# Patient Record
Sex: FEMALE | Race: WHITE | NOT HISPANIC OR LATINO | URBAN - METROPOLITAN AREA
[De-identification: names, ages, dates, MRNs, and addresses within clinical notes are randomized per-mention and may not be internally consistent; named-entity substitution may affect disease eponyms.]

---

## 2021-05-05 ENCOUNTER — INPATIENT (INPATIENT)
Facility: HOSPITAL | Age: 66
LOS: 0 days | Discharge: HOME | DRG: 274 | End: 2021-05-06
Attending: INTERNAL MEDICINE | Admitting: INTERNAL MEDICINE
Payer: MEDICARE

## 2021-05-05 VITALS — WEIGHT: 147.05 LBS | HEIGHT: 64 IN

## 2021-05-05 DIAGNOSIS — I10 ESSENTIAL (PRIMARY) HYPERTENSION: ICD-10-CM

## 2021-05-05 DIAGNOSIS — Z98.890 OTHER SPECIFIED POSTPROCEDURAL STATES: Chronic | ICD-10-CM

## 2021-05-05 DIAGNOSIS — I48.0 PAROXYSMAL ATRIAL FIBRILLATION: ICD-10-CM

## 2021-05-05 LAB
ANION GAP SERPL CALC-SCNC: 13 MMOL/L — SIGNIFICANT CHANGE UP (ref 5–17)
APTT BLD: 36.8 SEC — HIGH (ref 27.5–35.5)
BLD GP AB SCN SERPL QL: NEGATIVE — SIGNIFICANT CHANGE UP
BUN SERPL-MCNC: 31 MG/DL — HIGH (ref 7–23)
CALCIUM SERPL-MCNC: 9.4 MG/DL — SIGNIFICANT CHANGE UP (ref 8.4–10.5)
CHLORIDE SERPL-SCNC: 106 MMOL/L — SIGNIFICANT CHANGE UP (ref 96–108)
CO2 SERPL-SCNC: 21 MMOL/L — LOW (ref 22–31)
CREAT SERPL-MCNC: 1.03 MG/DL — SIGNIFICANT CHANGE UP (ref 0.5–1.3)
GLUCOSE SERPL-MCNC: 115 MG/DL — HIGH (ref 70–99)
HCT VFR BLD CALC: 40.8 % — SIGNIFICANT CHANGE UP (ref 34.5–45)
HGB BLD-MCNC: 13.5 G/DL — SIGNIFICANT CHANGE UP (ref 11.5–15.5)
INR BLD: 1.38 — HIGH (ref 0.88–1.16)
MCHC RBC-ENTMCNC: 29.2 PG — SIGNIFICANT CHANGE UP (ref 27–34)
MCHC RBC-ENTMCNC: 33.1 GM/DL — SIGNIFICANT CHANGE UP (ref 32–36)
MCV RBC AUTO: 88.3 FL — SIGNIFICANT CHANGE UP (ref 80–100)
NRBC # BLD: 0 /100 WBCS — SIGNIFICANT CHANGE UP (ref 0–0)
PLATELET # BLD AUTO: 315 K/UL — SIGNIFICANT CHANGE UP (ref 150–400)
POTASSIUM SERPL-MCNC: 4.2 MMOL/L — SIGNIFICANT CHANGE UP (ref 3.5–5.3)
POTASSIUM SERPL-SCNC: 4.2 MMOL/L — SIGNIFICANT CHANGE UP (ref 3.5–5.3)
PROTHROM AB SERPL-ACNC: 16.3 SEC — HIGH (ref 10.6–13.6)
RBC # BLD: 4.62 M/UL — SIGNIFICANT CHANGE UP (ref 3.8–5.2)
RBC # FLD: 13.2 % — SIGNIFICANT CHANGE UP (ref 10.3–14.5)
RH IG SCN BLD-IMP: POSITIVE — SIGNIFICANT CHANGE UP
SODIUM SERPL-SCNC: 140 MMOL/L — SIGNIFICANT CHANGE UP (ref 135–145)
WBC # BLD: 10.27 K/UL — SIGNIFICANT CHANGE UP (ref 3.8–10.5)
WBC # FLD AUTO: 10.27 K/UL — SIGNIFICANT CHANGE UP (ref 3.8–10.5)

## 2021-05-05 RX ORDER — METHYLPREDNISOLONE 4 MG
1 TABLET ORAL
Qty: 0 | Refills: 0 | DISCHARGE

## 2021-05-05 RX ORDER — PROPAFENONE HCL 150 MG
225 TABLET ORAL ONCE
Refills: 0 | Status: COMPLETED | OUTPATIENT
Start: 2021-05-05 | End: 2021-05-05

## 2021-05-05 RX ORDER — ACETAMINOPHEN 500 MG
650 TABLET ORAL ONCE
Refills: 0 | Status: COMPLETED | OUTPATIENT
Start: 2021-05-05 | End: 2021-05-05

## 2021-05-05 RX ORDER — EZETIMIBE 10 MG/1
1 TABLET ORAL
Qty: 0 | Refills: 0 | DISCHARGE

## 2021-05-05 RX ORDER — CEFAZOLIN SODIUM 1 G
2000 VIAL (EA) INJECTION ONCE
Refills: 0 | Status: COMPLETED | OUTPATIENT
Start: 2021-05-05 | End: 2021-05-05

## 2021-05-05 RX ORDER — AMLODIPINE BESYLATE 2.5 MG/1
1 TABLET ORAL
Qty: 0 | Refills: 0 | DISCHARGE

## 2021-05-05 RX ORDER — LISINOPRIL 2.5 MG/1
20 TABLET ORAL DAILY
Refills: 0 | Status: DISCONTINUED | OUTPATIENT
Start: 2021-05-05 | End: 2021-05-06

## 2021-05-05 RX ORDER — CEFAZOLIN SODIUM 1 G
2000 VIAL (EA) INJECTION ONCE
Refills: 0 | Status: DISCONTINUED | OUTPATIENT
Start: 2021-05-05 | End: 2021-05-05

## 2021-05-05 RX ORDER — LISINOPRIL 2.5 MG/1
1 TABLET ORAL
Qty: 0 | Refills: 0 | DISCHARGE

## 2021-05-05 RX ORDER — RIVAROXABAN 15 MG-20MG
20 KIT ORAL
Refills: 0 | Status: DISCONTINUED | OUTPATIENT
Start: 2021-05-05 | End: 2021-05-06

## 2021-05-05 RX ORDER — PANTOPRAZOLE SODIUM 20 MG/1
40 TABLET, DELAYED RELEASE ORAL
Refills: 0 | Status: DISCONTINUED | OUTPATIENT
Start: 2021-05-05 | End: 2021-05-06

## 2021-05-05 RX ORDER — PROPAFENONE HCL 150 MG
1 TABLET ORAL
Qty: 0 | Refills: 0 | DISCHARGE

## 2021-05-05 RX ORDER — RIVAROXABAN 15 MG-20MG
1 KIT ORAL
Qty: 0 | Refills: 0 | DISCHARGE

## 2021-05-05 RX ORDER — AMLODIPINE BESYLATE 2.5 MG/1
5 TABLET ORAL DAILY
Refills: 0 | Status: DISCONTINUED | OUTPATIENT
Start: 2021-05-05 | End: 2021-05-06

## 2021-05-05 RX ADMIN — Medication 650 MILLIGRAM(S): at 21:17

## 2021-05-05 RX ADMIN — PANTOPRAZOLE SODIUM 40 MILLIGRAM(S): 20 TABLET, DELAYED RELEASE ORAL at 16:42

## 2021-05-05 RX ADMIN — Medication 2000 MILLIGRAM(S): at 09:00

## 2021-05-05 RX ADMIN — Medication 650 MILLIGRAM(S): at 20:17

## 2021-05-05 RX ADMIN — AMLODIPINE BESYLATE 5 MILLIGRAM(S): 2.5 TABLET ORAL at 16:42

## 2021-05-05 RX ADMIN — RIVAROXABAN 20 MILLIGRAM(S): KIT at 21:43

## 2021-05-05 RX ADMIN — Medication 225 MILLIGRAM(S): at 21:43

## 2021-05-05 NOTE — H&P ADULT - NSICDXPASTMEDICALHX_GEN_ALL_CORE_FT
PAST MEDICAL HISTORY:  HLD (hyperlipidemia)     HTN (hypertension)     Paroxysmal atrial fibrillation

## 2021-05-05 NOTE — CHART NOTE - NSCHARTNOTEFT_GEN_A_CORE
Pre-op Diagnosis:  Paroxysmal atrial fibrillation  Atrial flutter     Post-op Diagnosis:  Same     Procedure:  Pulmonary vein isolation  CTI ablation    Electrophysiologist:  Mu Huber     Fellow:  Delphine Denny MD    Anesthesia:  General anesthesia     Access:  Bilateral femoral vein access     Description:  Successful pulmonary vein isolation  Successful cavotricuspid isthmus ablation    Complications:  None     EBL:  less than 30 ml     Disposition:  stable, to recovery     Plan:  Admit to inpatient with discharge tomorrow.  Bed rest for 2 hours.  ECG post procedure.  Resume xarelto tonight  Pantoprazole 40 mg po daily for 30 days.   Resume propafenone.

## 2021-05-05 NOTE — H&P ADULT - ASSESSMENT
64 yo F with history of HTN, HLD. non obstructive CAD, paroxsymal atrial fibrillation presents today for scheduled EPS and an ablation.

## 2021-05-05 NOTE — H&P ADULT - NSICDXPASTSURGICALHX_GEN_ALL_CORE_FT
PAST SURGICAL HISTORY:  History of hand surgery     History of hernia repair     History of knee surgery

## 2021-05-06 ENCOUNTER — TRANSCRIPTION ENCOUNTER (OUTPATIENT)
Age: 66
End: 2021-05-06

## 2021-05-06 VITALS — TEMPERATURE: 98 F

## 2021-05-06 PROCEDURE — 85730 THROMBOPLASTIN TIME PARTIAL: CPT

## 2021-05-06 PROCEDURE — 86901 BLOOD TYPING SEROLOGIC RH(D): CPT

## 2021-05-06 PROCEDURE — C1732: CPT

## 2021-05-06 PROCEDURE — 86850 RBC ANTIBODY SCREEN: CPT

## 2021-05-06 PROCEDURE — 85610 PROTHROMBIN TIME: CPT

## 2021-05-06 PROCEDURE — 85027 COMPLETE CBC AUTOMATED: CPT

## 2021-05-06 PROCEDURE — 86900 BLOOD TYPING SEROLOGIC ABO: CPT

## 2021-05-06 PROCEDURE — C1766: CPT

## 2021-05-06 PROCEDURE — 80048 BASIC METABOLIC PNL TOTAL CA: CPT

## 2021-05-06 PROCEDURE — C1769: CPT

## 2021-05-06 PROCEDURE — C1730: CPT

## 2021-05-06 PROCEDURE — C1759: CPT

## 2021-05-06 PROCEDURE — C1893: CPT

## 2021-05-06 PROCEDURE — C1894: CPT

## 2021-05-06 RX ORDER — PANTOPRAZOLE SODIUM 20 MG/1
1 TABLET, DELAYED RELEASE ORAL
Qty: 30 | Refills: 0
Start: 2021-05-06 | End: 2021-06-04

## 2021-05-06 RX ADMIN — AMLODIPINE BESYLATE 5 MILLIGRAM(S): 2.5 TABLET ORAL at 06:11

## 2021-05-06 RX ADMIN — LISINOPRIL 20 MILLIGRAM(S): 2.5 TABLET ORAL at 06:11

## 2021-05-06 RX ADMIN — PANTOPRAZOLE SODIUM 40 MILLIGRAM(S): 20 TABLET, DELAYED RELEASE ORAL at 06:11

## 2021-05-06 NOTE — DISCHARGE NOTE PROVIDER - HOSPITAL COURSE
Louisa Zimmerman is a 66 yo F with hx of HTN, HLD, non obstructive CAD, paroxsymal atrial fibrillation who presented on 5/5 for EPS and an ablation.  Patient was diagnosed with atrial fibrillation in 10/2020, she was managed on metoprolol and propafenone.  She was symptomatic with palpitations when she is in atrial fibrillation.  She is compliant with Xarelto, without any bleeding issues.  On 5/5 she underwent PVI isolation and CTI ablation.  Post procedure remains in NSR.  Vital signs stable.       Louisa Zimmerman is a 64 yo F with hx of HTN, HLD, non obstructive CAD, paroxsymal atrial fibrillation who presented on 5/5 for EPS and an ablation.  Patient was diagnosed with atrial fibrillation in 10/2020, she was managed on propafenone.  She was symptomatic with palpitations when she is in atrial fibrillation.  She is compliant with Xarelto, without any bleeding issues.  On 5/5 she underwent PVI isolation and CTI ablation.  Post procedure remains in NSR.  Vital signs stable. Patient cleared for discharge home.     Physical Exam  Appearance: Normal	  HEENT:   Normal oral mucosa	  Neck: Supple, -JVD  Cardiovascular: Normal S1 S2, no murmurs  Respiratory: Lungs clear to auscultation	  Gastrointestinal:  Soft, Non-tender, + BS	  Extremities: BLE warm, dry no edema.  Bilateral groin sites soft, no drainage, erythema, or hematoma, FAUSTINA  Neurologic: Non-focal  Psychiatry: A & O x 3, Mood & affect appropriate

## 2021-05-06 NOTE — DISCHARGE NOTE PROVIDER - NSDCMRMEDTOKEN_GEN_ALL_CORE_FT
amLODIPine 5 mg oral tablet: 1 tab(s) orally once a day  Caltrate 600 + D oral tablet: 1 tab(s) orally once a day  lisinopril 20 mg oral tablet: 1 tab(s) orally once a day  magnesium gluconate 250 mg oral tablet: 1 tab(s) orally once a day  propafenone 225 mg oral capsule, extended release: 1 cap(s) orally once a day  Xarelto 20 mg oral tablet: 1 tab(s) orally once a day (in the evening)  Zetia 10 mg oral tablet: 1 tab(s) orally once a day   amLODIPine 5 mg oral tablet: 1 tab(s) orally once a day  Caltrate 600 + D oral tablet: 1 tab(s) orally once a day  lisinopril 20 mg oral tablet: 1 tab(s) orally once a day  magnesium gluconate 250 mg oral tablet: 1 tab(s) orally once a day  pantoprazole 40 mg oral delayed release tablet: 1 tab(s) orally once a day (before a meal)  propafenone 225 mg oral capsule, extended release: 1 cap(s) orally once a day  Xarelto 20 mg oral tablet: 1 tab(s) orally once a day (in the evening)  Zetia 10 mg oral tablet: 1 tab(s) orally once a day

## 2021-05-06 NOTE — DISCHARGE NOTE PROVIDER - NSDCFUADDINST_GEN_ALL_CORE_FT
You had an atrial fibrillation ablation on 5/5/21  Wound care instruction:  Avoid strenuous activities such as lifting, running, pushing or sex for 1 week in order to avoid bleeding complications in the groin.  If any questions about the groin, call us at [phone].  Ok to shower tonight.  Avoid bathing for 1 week

## 2021-05-06 NOTE — DISCHARGE NOTE PROVIDER - NSDCCPCAREPLAN_GEN_ALL_CORE_FT
PRINCIPAL DISCHARGE DIAGNOSIS  Diagnosis: PAF (paroxysmal atrial fibrillation)  Assessment and Plan of Treatment:

## 2021-05-06 NOTE — DISCHARGE NOTE PROVIDER - NSDCCPTREATMENT_GEN_ALL_CORE_FT
PRINCIPAL PROCEDURE  Procedure: Intracardiac catheter ablation for atrial fibrillation  Findings and Treatment:

## 2021-05-06 NOTE — DISCHARGE NOTE NURSING/CASE MANAGEMENT/SOCIAL WORK - PATIENT PORTAL LINK FT
You can access the FollowMyHealth Patient Portal offered by [facility] by registering at the following website: [email or web address]. By joining [facility]’s FollowMyHealth portal, you will also be able to view your health information using other applications (apps) compatible with our system.

## 2021-05-06 NOTE — DISCHARGE NOTE PROVIDER - CARE PROVIDER_API CALL
Mu Huber)  Cardiac Electrophysiology; Cardiovascular Disease  [address] [place] [address]  Phone: [phone]  Fax: [phone]  Established Patient  Follow Up Time: 1 month

## 2021-05-12 DIAGNOSIS — I25.10 ATHEROSCLEROTIC HEART DISEASE OF NATIVE CORONARY ARTERY WITHOUT ANGINA PECTORIS: ICD-10-CM

## 2021-05-12 DIAGNOSIS — Z79.01 LONG TERM (CURRENT) USE OF ANTICOAGULANTS: ICD-10-CM

## 2021-05-12 DIAGNOSIS — I48.0 PAROXYSMAL ATRIAL FIBRILLATION: ICD-10-CM

## 2021-05-12 DIAGNOSIS — I48.92 UNSPECIFIED ATRIAL FLUTTER: ICD-10-CM

## 2021-05-12 DIAGNOSIS — I10 ESSENTIAL (PRIMARY) HYPERTENSION: ICD-10-CM

## 2021-05-12 DIAGNOSIS — E78.5 HYPERLIPIDEMIA, UNSPECIFIED: ICD-10-CM

## 2021-05-18 PROBLEM — Z00.00 ENCOUNTER FOR PREVENTIVE HEALTH EXAMINATION: Status: ACTIVE | Noted: 2021-05-18

## 2024-05-26 NOTE — CHART NOTE - NSCHARTNOTESELECT_GEN_ALL_CORE
SUBJECTIVE:    Patient ID: Sophie Courtney is a 44 y.o. female.    Chief Complaint: Annual Exam (Annual wellness exam//no med bottles//ref to ANITA Brooks for colonoscopy made//tc)    Pt here for an annual exam.     BP is doing today.   Denies CP/SOB/HA.  Has been taking chlorthalindone, diovan.  Has been exercising.     Has not had labs done.  Plans to do them today.    Has been better at taking her lipitor, at least twice a week.  Her mom and dad has had a Stroke/TIA.      Has switched jobs since last visit and still trying to adjust.  -------------------------------------------------------------------------------------------------  Mammo 10/2023  Pap (Sandoval)  Cscope due       No visits with results within 8 Month(s) from this visit.   Latest known visit with results is:   Office Visit on 01/04/2023   Component Date Value Ref Range Status    Glucose 07/03/2023 80  65 - 99 mg/dL Final    Comment:               Fasting reference interval         BUN 07/03/2023 17  7 - 25 mg/dL Final    Creatinine 07/03/2023 0.73  0.50 - 0.99 mg/dL Final    eGFR 07/03/2023 104  > OR = 60 mL/min/1.73m2 Final    Comment: The eGFR is based on the CKD-EPI 2021 equation. To calculate   the new eGFR from a previous Creatinine or Cystatin C  result, go to https://www.kidney.org/professionals/  kdoqi/gfr%5Fcalculator      BUN/Creatinine Ratio 07/03/2023 NOT APPLICABLE  6 - 22 (calc) Final    Sodium 07/03/2023 139  135 - 146 mmol/L Final    Potassium 07/03/2023 3.6  3.5 - 5.3 mmol/L Final    Chloride 07/03/2023 103  98 - 110 mmol/L Final    CO2 07/03/2023 28  20 - 32 mmol/L Final    Calcium 07/03/2023 9.2  8.6 - 10.2 mg/dL Final    Total Protein 07/03/2023 6.9  6.1 - 8.1 g/dL Final    Albumin 07/03/2023 4.3  3.6 - 5.1 g/dL Final    Globulin, Total 07/03/2023 2.6  1.9 - 3.7 g/dL (calc) Final    Albumin/Globulin Ratio 07/03/2023 1.7  1.0 - 2.5 (calc) Final    Total Bilirubin 07/03/2023 1.0  0.2 - 1.2 mg/dL Final    Alkaline Phosphatase  Brief EP post op note 07/03/2023 34  31 - 125 U/L Final    AST 07/03/2023 15  10 - 30 U/L Final    ALT 07/03/2023 19  6 - 29 U/L Final    Cholesterol 07/03/2023 240 (H)  <200 mg/dL Final    HDL 07/03/2023 61  > OR = 50 mg/dL Final    Triglycerides 07/03/2023 41  <150 mg/dL Final    LDL Cholesterol 07/03/2023 167 (H)  mg/dL (calc) Final    Comment: Reference range: <100     Desirable range <100 mg/dL for primary prevention;    <70 mg/dL for patients with CHD or diabetic patients   with > or = 2 CHD risk factors.     LDL-C is now calculated using the Michael-Murry   calculation, which is a validated novel method providing   better accuracy than the Friedewald equation in the   estimation of LDL-C.   Michael HARRELL et al. KEZIA. 2013;310(19): 1581-6495   (http://education.[employer]/faq/KQN902)      HDL/Cholesterol Ratio 07/03/2023 3.9  <5.0 (calc) Final    Non HDL Chol. (LDL+VLDL) 07/03/2023 179 (H)  <130 mg/dL (calc) Final    Comment: For patients with diabetes plus 1 major ASCVD risk   factor, treating to a non-HDL-C goal of <100 mg/dL   (LDL-C of <70 mg/dL) is considered a therapeutic   option.      Creatinine, Urine 07/05/2023 107  20 - 275 mg/dL Final    Microalb, Ur 07/05/2023 0.4  See Note: mg/dL Final    Comment: Reference Range:    Reference Range  Not established      Microalb/Creat Ratio 07/05/2023 4  <30 mcg/mg creat Final    Comment:    The ADA defines abnormalities in albumin  excretion as follows:     Albuminuria Category        Result (mcg/mg creatinine)     Normal to Mildly increased   <30  Moderately increased            Severely increased           > OR = 300     The ADA recommends that at least two of three  specimens collected within a 3-6 month period be  abnormal before considering a patient to be  within a diagnostic category.      TSH w/reflex to FT4 07/03/2023 1.38  mIU/L Final    Comment:           Reference Range                         > or = 20 Years  0.40-4.50                              Pregnancy  Ranges            First trimester    0.26-2.66            Second trimester   0.55-2.73            Third trimester    0.43-2.91      Color, UA 07/05/2023 YELLOW  YELLOW Final    Appearance, UA 07/05/2023 CLEAR  CLEAR Final    Specific Gravity, UA 07/05/2023 1.019  1.001 - 1.035 Final    pH, UA 07/05/2023 < OR = 5.0  5.0 - 8.0 Final    Glucose, UA 07/05/2023 NEGATIVE  NEGATIVE Final    Bilirubin, UA 07/05/2023 NEGATIVE  NEGATIVE Final    Ketones, UA 07/05/2023 NEGATIVE  NEGATIVE Final    Occult Blood UA 07/05/2023 NEGATIVE  NEGATIVE Final    Protein, UA 07/05/2023 NEGATIVE  NEGATIVE Final    Nitrite, UA 07/05/2023 NEGATIVE  NEGATIVE Final    Leukocytes, UA 07/05/2023 NEGATIVE  NEGATIVE Final    WBC Casts, UA 07/05/2023 NONE SEEN  < OR = 5 /HPF Final    RBC Casts, UA 07/05/2023 NONE SEEN  < OR = 2 /HPF Final    Squam Epithel, UA 07/05/2023 NONE SEEN  < OR = 5 /HPF Final    Bacteria, UA 07/05/2023 NONE SEEN  NONE SEEN /HPF Final    Hyaline Casts, UA 07/05/2023 NONE SEEN  NONE SEEN /LPF Final    Service Cmt: 07/05/2023    Final    Comment: This urine was analyzed for the presence of WBC,   RBC, bacteria, casts, and other formed elements.   Only those elements seen were reported.            Reflexive Urine Culture 07/05/2023    Final    NO CULTURE INDICATED    WBC 07/03/2023 3.9  3.8 - 10.8 Thousand/uL Final    RBC 07/03/2023 4.08  3.80 - 5.10 Million/uL Final    Hemoglobin 07/03/2023 12.8  11.7 - 15.5 g/dL Final    Hematocrit 07/03/2023 38.4  35.0 - 45.0 % Final    MCV 07/03/2023 94.1  80.0 - 100.0 fL Final    MCH 07/03/2023 31.4  27.0 - 33.0 pg Final    MCHC 07/03/2023 33.3  32.0 - 36.0 g/dL Final    RDW 07/03/2023 12.4  11.0 - 15.0 % Final    Platelets 07/03/2023 268  140 - 400 Thousand/uL Final    MPV 07/03/2023 10.7  7.5 - 12.5 fL Final    Neutrophils, Abs 07/03/2023 1,833  1,500 - 7,800 cells/uL Final    Lymph # 07/03/2023 1,743  850 - 3,900 cells/uL Final    Mono # 07/03/2023 273  200 - 950 cells/uL Final    Eos #  07/03/2023 31  15 - 500 cells/uL Final    Baso # 07/03/2023 20  0 - 200 cells/uL Final    Neutrophils Relative 07/03/2023 47  % Final    Lymph % 07/03/2023 44.7  % Final    Mono % 07/03/2023 7.0  % Final    Eosinophil % 07/03/2023 0.8  % Final    Basophil % 07/03/2023 0.5  % Final         No visits with results within 6 Month(s) from this visit.   Latest known visit with results is:   Office Visit on 01/04/2023   Component Date Value Ref Range Status    Glucose 07/03/2023 80  65 - 99 mg/dL Final    BUN 07/03/2023 17  7 - 25 mg/dL Final    Creatinine 07/03/2023 0.73  0.50 - 0.99 mg/dL Final    eGFR 07/03/2023 104  > OR = 60 mL/min/1.73m2 Final    BUN/Creatinine Ratio 07/03/2023 NOT APPLICABLE  6 - 22 (calc) Final    Sodium 07/03/2023 139  135 - 146 mmol/L Final    Potassium 07/03/2023 3.6  3.5 - 5.3 mmol/L Final    Chloride 07/03/2023 103  98 - 110 mmol/L Final    CO2 07/03/2023 28  20 - 32 mmol/L Final    Calcium 07/03/2023 9.2  8.6 - 10.2 mg/dL Final    Total Protein 07/03/2023 6.9  6.1 - 8.1 g/dL Final    Albumin 07/03/2023 4.3  3.6 - 5.1 g/dL Final    Globulin, Total 07/03/2023 2.6  1.9 - 3.7 g/dL (calc) Final    Albumin/Globulin Ratio 07/03/2023 1.7  1.0 - 2.5 (calc) Final    Total Bilirubin 07/03/2023 1.0  0.2 - 1.2 mg/dL Final    Alkaline Phosphatase 07/03/2023 34  31 - 125 U/L Final    AST 07/03/2023 15  10 - 30 U/L Final    ALT 07/03/2023 19  6 - 29 U/L Final    Cholesterol 07/03/2023 240 (H)  <200 mg/dL Final    HDL 07/03/2023 61  > OR = 50 mg/dL Final    Triglycerides 07/03/2023 41  <150 mg/dL Final    LDL Cholesterol 07/03/2023 167 (H)  mg/dL (calc) Final    HDL/Cholesterol Ratio 07/03/2023 3.9  <5.0 (calc) Final    Non HDL Chol. (LDL+VLDL) 07/03/2023 179 (H)  <130 mg/dL (calc) Final    Creatinine, Urine 07/05/2023 107  20 - 275 mg/dL Final    Microalb, Ur 07/05/2023 0.4  See Note: mg/dL Final    Microalb/Creat Ratio 07/05/2023 4  <30 mcg/mg creat Final    TSH w/reflex to FT4 07/03/2023 1.38  mIU/L Final     Color, UA 07/05/2023 YELLOW  YELLOW Final    Appearance, UA 07/05/2023 CLEAR  CLEAR Final    Specific Gravity, UA 07/05/2023 1.019  1.001 - 1.035 Final    pH, UA 07/05/2023 < OR = 5.0  5.0 - 8.0 Final    Glucose, UA 07/05/2023 NEGATIVE  NEGATIVE Final    Bilirubin, UA 07/05/2023 NEGATIVE  NEGATIVE Final    Ketones, UA 07/05/2023 NEGATIVE  NEGATIVE Final    Occult Blood UA 07/05/2023 NEGATIVE  NEGATIVE Final    Protein, UA 07/05/2023 NEGATIVE  NEGATIVE Final    Nitrite, UA 07/05/2023 NEGATIVE  NEGATIVE Final    Leukocytes, UA 07/05/2023 NEGATIVE  NEGATIVE Final    WBC Casts, UA 07/05/2023 NONE SEEN  < OR = 5 /HPF Final    RBC Casts, UA 07/05/2023 NONE SEEN  < OR = 2 /HPF Final    Squam Epithel, UA 07/05/2023 NONE SEEN  < OR = 5 /HPF Final    Bacteria, UA 07/05/2023 NONE SEEN  NONE SEEN /HPF Final    Hyaline Casts, UA 07/05/2023 NONE SEEN  NONE SEEN /LPF Final    Service Cmt: 07/05/2023    Final    Reflexive Urine Culture 07/05/2023    Final    WBC 07/03/2023 3.9  3.8 - 10.8 Thousand/uL Final    RBC 07/03/2023 4.08  3.80 - 5.10 Million/uL Final    Hemoglobin 07/03/2023 12.8  11.7 - 15.5 g/dL Final    Hematocrit 07/03/2023 38.4  35.0 - 45.0 % Final    MCV 07/03/2023 94.1  80.0 - 100.0 fL Final    MCH 07/03/2023 31.4  27.0 - 33.0 pg Final    MCHC 07/03/2023 33.3  32.0 - 36.0 g/dL Final    RDW 07/03/2023 12.4  11.0 - 15.0 % Final    Platelets 07/03/2023 268  140 - 400 Thousand/uL Final    MPV 07/03/2023 10.7  7.5 - 12.5 fL Final    Neutrophils, Abs 07/03/2023 1,833  1,500 - 7,800 cells/uL Final    Lymph # 07/03/2023 1,743  850 - 3,900 cells/uL Final    Mono # 07/03/2023 273  200 - 950 cells/uL Final    Eos # 07/03/2023 31  15 - 500 cells/uL Final    Baso # 07/03/2023 20  0 - 200 cells/uL Final    Neutrophils Relative 07/03/2023 47  % Final    Lymph % 07/03/2023 44.7  % Final    Mono % 07/03/2023 7.0  % Final    Eosinophil % 07/03/2023 0.8  % Final    Basophil % 07/03/2023 0.5  % Final       Past Medical History:    Diagnosis Date    Hyperlipidemia     Hypertension      Social History     Socioeconomic History    Marital status: Single   Tobacco Use    Smoking status: Never    Smokeless tobacco: Never   Substance and Sexual Activity    Alcohol use: No    Drug use: No    Sexual activity: Never     Social Determinants of Health     Financial Resource Strain: Low Risk  (5/26/2024)    Overall Financial Resource Strain (CARDIA)     Difficulty of Paying Living Expenses: Not very hard   Food Insecurity: No Food Insecurity (5/26/2024)    Hunger Vital Sign     Worried About Running Out of Food in the Last Year: Never true     Ran Out of Food in the Last Year: Never true   Transportation Needs: No Transportation Needs (7/4/2023)    PRAPARE - Transportation     Lack of Transportation (Medical): No     Lack of Transportation (Non-Medical): No   Physical Activity: Sufficiently Active (5/26/2024)    Exercise Vital Sign     Days of Exercise per Week: 4 days     Minutes of Exercise per Session: 60 min   Stress: No Stress Concern Present (5/26/2024)    [place] [employer] - Occupational Stress Questionnaire     Feeling of Stress : Only a little   Housing Stability: Low Risk  (7/4/2023)    Housing Stability Vital Sign     Unable to Pay for Housing in the Last Year: No     Number of Places Lived in the Last Year: 1     Unstable Housing in the Last Year: No     Past Surgical History:   Procedure Laterality Date    ACHILLES TENDON SURGERY Left 2003    hysterecotmy      KNEE ARTHROSCOPY W/ ACL RECONSTRUCTION Right 1999    MYOMECTOMY  2009    Seven uterine fibroids, largest 11 cm in size     MYOMECTOMY  2014    The specimen weighs 779 g and consists of 27 rounded pieces of rubbery tan-pink tissue. The nodules vary from    SALPINGOOPHORECTOMY Right 3/12/2021    Procedure: SALPINGO-OOPHORECTOMY;  Surgeon: Peace Sandoval MD;  [facility];  Service: OB/GYN;  Laterality: Right;    TOTAL ABDOMINAL HYSTERECTOMY N/A  3/12/2021    Procedure: HYSTERECTOMY, TOTAL, ABDOMINAL;  Surgeon: Peace Sandoval MD;  Location: [facility];  Service: OB/GYN;  Laterality: N/A;    WISDOM TOOTH EXTRACTION  2013 2013 & 2015      Family History   Problem Relation Name Age of Onset    Hypertension Father      Hypertension Mother      Stroke Maternal Aunt      Breast cancer Paternal Aunt  60       Review of patient's allergies indicates:   Allergen Reactions    [place] Hives, Rash and Swelling    Crestor [rosuvastatin] Other (See Comments)     Muscle weakness    Nickel sutures [surgical stainless steel] Hives    Neosporin [hydrocortisone] Rash       Current Outpatient Medications:     ascorbic acid, vitamin C, (VITAMIN C) 1000 MG tablet, Take 1,000 mg by mouth once daily., Disp: , Rfl:     chlorthalidone (HYGROTEN) 25 MG Tab, Take 1 tablet (25 mg total) by mouth once daily., Disp: 90 tablet, Rfl: 3    ergocalciferol, vitamin D2, (VITAMIN D ORAL), Take by mouth. Per patient, she takes vitamin D 3 tab, not D 2., Disp: , Rfl:     multivitamin capsule, Take 1 capsule by mouth once daily., Disp: , Rfl:     atorvastatin (LIPITOR) 20 MG tablet, Take 1 tablet (20 mg total) by mouth once daily., Disp: 90 tablet, Rfl: 1    valsartan (DIOVAN) 80 MG tablet, Take 1 tablet (80 mg total) by mouth once daily., Disp: 90 tablet, Rfl: 1    Review of Systems   Constitutional:  Negative for activity change, appetite change, fatigue, fever and unexpected weight change.   HENT:  Negative for hearing loss, rhinorrhea and trouble swallowing.    Eyes:  Negative for discharge and visual disturbance.   Respiratory:  Negative for cough, chest tightness, shortness of breath and wheezing.    Cardiovascular:  Negative for chest pain, palpitations and leg swelling.   Gastrointestinal:  Negative for abdominal pain, blood in stool, constipation, diarrhea, nausea and vomiting.        -heartburn   Endocrine: Negative for polydipsia and polyuria.   Genitourinary:  Negative for  "difficulty urinating, dysuria, frequency, hematuria, menstrual problem and urgency.   Musculoskeletal:  Negative for arthralgias, back pain, joint swelling, myalgias and neck pain.   Skin:  Negative for rash.   Neurological:  Negative for dizziness, weakness, numbness and headaches.   Hematological:  Does not bruise/bleed easily.   Psychiatric/Behavioral:  Negative for confusion, dysphoric mood, sleep disturbance and suicidal ideas. The patient is not nervous/anxious.    All other systems reviewed and are negative.         Objective:      Vitals:    05/30/24 0820   BP: 120/72   Pulse: 78   Weight: 83.5 kg (184 lb)   Height: 5' 4" (1.626 m)       Wt Readings from Last 6 Encounters:   05/30/24 83.5 kg (184 lb)   12/19/23 77.4 kg (170 lb 10.2 oz)   07/05/23 77.6 kg (171 lb)   01/04/23 76 kg (167 lb 9.6 oz)   10/04/22 78.4 kg (172 lb 13.5 oz)   06/22/22 85.7 kg (189 lb)         Physical Exam  Vitals reviewed.   Constitutional:       General: She is not in acute distress.     Appearance: Normal appearance. She is well-developed. She is obese.   HENT:      Head: Normocephalic and atraumatic.   Neck:      Thyroid: No thyromegaly.   Cardiovascular:      Rate and Rhythm: Normal rate and regular rhythm.      Heart sounds: Normal heart sounds. No murmur heard.     No friction rub.   Pulmonary:      Effort: Pulmonary effort is normal.      Breath sounds: Normal breath sounds. No wheezing or rales.   Abdominal:      General: Bowel sounds are normal. There is no distension.      Palpations: Abdomen is soft.      Tenderness: There is no abdominal tenderness.   Musculoskeletal:      Right hand: No bony tenderness. Decreased range of motion (5th finger flexion).      Cervical back: Neck supple.   Lymphadenopathy:      Cervical: No cervical adenopathy.   Skin:     General: Skin is warm and dry.      Findings: No rash.   Neurological:      Mental Status: She is alert and oriented to person, place, and time.   Psychiatric:         " Attention and Perception: She is attentive.         Speech: Speech normal.         Behavior: Behavior normal.         Thought Content: Thought content normal.         Judgment: Judgment normal.           Assessment:       1. Annual physical exam    2. Essential hypertension    3. Screening for malignant neoplasm of colon    4. Medication refill           Plan:       Annual physical exam    Essential hypertension  Comments:  Controlled. Will continue to monitor BP on current medication regimen    Screening for malignant neoplasm of colon  Comments:  Will refer to GI for cscope.  Orders:  -     Ambulatory referral/consult to Gastroenterology; Future; Expected date: 06/06/2024    Medication refill  -     atorvastatin (LIPITOR) 20 MG tablet; Take 1 tablet (20 mg total) by mouth once daily.  Dispense: 90 tablet; Refill: 1  -     valsartan (DIOVAN) 80 MG tablet; Take 1 tablet (80 mg total) by mouth once daily.  Dispense: 90 tablet; Refill: 1      Labs and/or tests have been ordered for the evaluation/monitoring of acute/chronic conditions, to be done just before next visit.    Follow up in about 6 months (around 11/30/2024) for HTN.        5/30/2024 Rohan Pringle

## 2024-09-12 NOTE — H&P ADULT - HISTORY OF PRESENT ILLNESS
Ear Cerumen Removal    Date/Time: 9/12/2024 10:07 AM    Performed by: Teri Huizar RN  Authorized by: Sonia Duckworth DO    Consent:     Consent obtained:  Verbal    Consent given by:  Patient    Risks, benefits, and alternatives were discussed: yes      Risks discussed:  Bleeding, infection, pain, dizziness, incomplete removal and TM perforation    Alternatives discussed:  Referral  [place] protocol:     Procedure explained and questions answered to patient or proxy's satisfaction: yes      Relevant documents present and verified: yes      Required blood products, implants, devices, and special equipment available: yes      Site/side marked: yes      Immediately prior to procedure, a time out was called: yes      Patient identity confirmed:  Verbally with patient  Procedure details:     Location:  L ear and R ear    Procedure type: curette and irrigation      Procedure outcomes: cerumen removed      Equipment/Method of Removal:  Otoscope, curettes and lavage    Time:  Short amount of time    Effort:  Somewhat difficult and not difficult at all  Post-procedure details:     Inspection:  Some cerumen remaining, TM intact and bleeding    Hearing quality:  Diminished    Procedure completion:  Tolerated     66 yo F with history of HTN, HLD. non obstructive CAD, paroxsymal atrial fibrillation presents today for scheduled EPS and an ablation.   Patient was diagnosed with atrial fibrillation in10/2020, she was managed with metoprolol and propafenone. She is symptomatic with palpitations when she is in atrial fibrillation. She is compliant with Xarelto, no bleeding issues.